# Patient Record
Sex: MALE | Race: WHITE
[De-identification: names, ages, dates, MRNs, and addresses within clinical notes are randomized per-mention and may not be internally consistent; named-entity substitution may affect disease eponyms.]

---

## 2021-03-18 ENCOUNTER — APPOINTMENT (OUTPATIENT)
Age: 31
End: 2021-03-18

## 2022-07-04 ENCOUNTER — FORM ENCOUNTER (OUTPATIENT)
Age: 32
End: 2022-07-04

## 2023-04-09 ENCOUNTER — FORM ENCOUNTER (OUTPATIENT)
Age: 33
End: 2023-04-09

## 2023-05-03 ENCOUNTER — APPOINTMENT (OUTPATIENT)
Dept: HEPATOLOGY | Facility: CLINIC | Age: 33
End: 2023-05-03
Payer: COMMERCIAL

## 2023-05-03 VITALS
HEART RATE: 85 BPM | DIASTOLIC BLOOD PRESSURE: 79 MMHG | SYSTOLIC BLOOD PRESSURE: 115 MMHG | BODY MASS INDEX: 31.02 KG/M2 | WEIGHT: 229 LBS | HEIGHT: 72 IN | OXYGEN SATURATION: 97 %

## 2023-05-03 DIAGNOSIS — D18.03 HEMANGIOMA OF INTRA-ABDOMINAL STRUCTURES: ICD-10-CM

## 2023-05-03 DIAGNOSIS — R77.2 ABNORMALITY OF ALPHAFETOPROTEIN: ICD-10-CM

## 2023-05-03 PROBLEM — Z00.00 ENCOUNTER FOR PREVENTIVE HEALTH EXAMINATION: Status: ACTIVE | Noted: 2023-05-03

## 2023-05-03 PROCEDURE — 99204 OFFICE O/P NEW MOD 45 MIN: CPT

## 2023-05-03 RX ORDER — RUXOLITINIB 15 MG/G
1.5 CREAM TOPICAL
Refills: 0 | Status: ACTIVE | COMMUNITY
Start: 2023-05-03

## 2023-05-03 RX ORDER — EMTRICITABINE AND TENOFOVIR DISOPROXIL FUMARATE 100; 150 MG/1; MG/1
100-150 TABLET, FILM COATED ORAL
Refills: 0 | Status: ACTIVE | COMMUNITY
Start: 2023-05-03

## 2023-05-04 NOTE — ASSESSMENT
[FreeTextEntry1] : \par \par Discussed the mildly elevated AFP could be his baseline. Testicular US neg. Liver on MRI without focal hepatic lesion or cirrhosis. Labs 4/10 showed elevated ferritin. Advised to avoid iron supplements. \par \par Pt gives consent to test HFE. Unable to get fibroscan today. \par \par Plans:\par Labs- today including AFP, iron, TIBC, HFE, TTG \par \par Return to clinic in 3 months and will try the Fibroscan again.

## 2023-05-04 NOTE — PHYSICAL EXAM
[Non-Tender] : non-tender [General Appearance - Alert] : alert [General Appearance - In No Acute Distress] : in no acute distress [General Appearance - Well Nourished] : well nourished [General Appearance - Well Developed] : well developed [Sclera] : the sclera and conjunctiva were normal [Neck Appearance] : the appearance of the neck was normal [Neck Cervical Mass (___cm)] : no neck mass was observed [] : no respiratory distress [Exaggerated Use Of Accessory Muscles For Inspiration] : no accessory muscle use [Edema] : there was no peripheral edema [Veins - Varicosity Changes] : there were no varicosital changes [Abdomen Soft] : soft [Abdomen Tenderness] : non-tender [Abdomen Mass (___ Cm)] : no abdominal mass palpated [Oriented To Time, Place, And Person] : oriented to person, place, and time [Scleral Icterus] : No Scleral Icterus [Abdominal  Ascites] : no ascites [Asterixis] : no asterixis observed [Jaundice] : No jaundice

## 2023-05-04 NOTE — REVIEW OF SYSTEMS
[Fever] : no fever [Chills] : no chills [Feeling Poorly] : not feeling poorly [Feeling Tired] : not feeling tired [Nosebleeds] : no nosebleeds [Nasal Discharge] : no nasal discharge [Sore Throat] : no sore throat [Hoarseness] : no hoarseness [Chest Pain] : no chest pain [Palpitations] : no palpitations [Leg Claudication] : no intermittent leg claudication [Lower Ext Edema] : no extremity edema [Shortness Of Breath] : no shortness of breath [Wheezing] : no wheezing [Cough] : no cough [SOB on Exertion] : no shortness of breath during exertion [Abdominal Pain] : no abdominal pain [Vomiting] : no vomiting [Constipation] : no constipation [Diarrhea] : no diarrhea [Melena] : no melena [Itching] : no itching [Dizziness] : no dizziness

## 2023-05-04 NOTE — HISTORY OF PRESENT ILLNESS
[FreeTextEntry1] : \par \par Referring physician: Gabe Martinez\par \par 33 y/o M () (Vegan for 20 years) with no PMH referred for elevated AFP. Pt reports 2 years ago, he developed testicular discomfort so he went to see urology. Urology did scrotum US and labs. US was neg and AFP came back ~11. Pt had abd CT 2021 which showed a small liver lesion likely hemangioma. F/u MRI 7/5/22 showed stable 9mm c/w hemangioma.\par \par Pt has had stable persistent AFP around 11 until last labs 4/10/23 which showed AFP 17. \par \par Today pt reports feeling well. Denies fever, chills, n/v/d/c ,SOB, CP, HA, dizziness, abd pain, jaundice. Pt is  and on Truvada for HIV prophylaxis. \par \par \par \par Medical Hx \par Surgical Hx: None\par Social Hx \par Tobacco: Never \par Alcohol: every other day: 4-5 drinks (~20 drinks a week)\par illicit drug: marijuana once a month. \par Herb and dietary Supplement: no \par FMH of liver disease: unknown.\par \par \par Labs:\par 4/10/23\par AFP: 17\par HAV IgM nonreactive\par HCV Ab nonreactive\par HBsAg nonreactive\par HBCore IgM nonreactive\par HBVsAb neg \par H/H 16.3/47.5   \par GLU 89\par Cr 1.11\par AST/ALT 32/39   ALP 85   TB 0.9\par Ferritin 118\par TIBC 328\par TSH 1.270\par Uric Acid 7.4\par \par \par Imaging:\par Abd MRI 7/5/2022:Liver is normal in size. 9mm liver lesion unchanged from previous. c/w a benign hemangioma and stable from previous. \par  \par \par \par \par

## 2023-05-04 NOTE — END OF VISIT
[FreeTextEntry3] : Attending note\par I have seen and examined patient at bedside. I agree with hx, ROS, physical examination, imp/suggestions as written by DNP/PA.\par Elevated AFP\par No extra hepatic malignancy has been found\par No hx of chronic liver disease \par No alarming signs or symptoms\par MRI showed stable hemangioma with no increase in size.  I have reviewed the natural history of hemangioma with patient in detail.  In my opinion no further testing is required for gross hemangiomas.\par His ALT is minimally elevated.  We attempted to obtain a FibroScan but for technical reasons we could not get a good imaging.\par His iron saturation was 45% and he has [place] ethnic background.  I have discussed the literature around hemochromatosis and iron overload with him in details.  He is very interested to know his HFE gene study.  \par Genetic counseling was done and the test was ordered.\par We have also educated him about the general health of individuals who practices MSM.  These included vaccination against hepatitis A, hepatitis B, and the potential role of anal Pap.\par I reached out to his referring physician to send the rest of the records to us\par

## 2023-05-23 ENCOUNTER — NON-APPOINTMENT (OUTPATIENT)
Age: 33
End: 2023-05-23

## 2023-05-23 LAB
FERRITIN SERPL-MCNC: 126 NG/ML
HBV SURFACE AB SERPL IA-ACNC: 3.5 MIU/ML
HEPATITIS A IGG ANTIBODY: NONREACTIVE
IRON SATN MFR SERPL: 38 %
IRON SERPL-MCNC: 107 UG/DL
TIBC SERPL-MCNC: 283 UG/DL
TM INTERPRETATION: NORMAL
TTG IGA SER IA-ACNC: <1.2 U/ML
TTG IGA SER-ACNC: NEGATIVE
UIBC SERPL-MCNC: 176 UG/DL

## 2023-09-18 ENCOUNTER — APPOINTMENT (OUTPATIENT)
Dept: HEPATOLOGY | Facility: CLINIC | Age: 33
End: 2023-09-18